# Patient Record
Sex: FEMALE | Race: OTHER | NOT HISPANIC OR LATINO
[De-identification: names, ages, dates, MRNs, and addresses within clinical notes are randomized per-mention and may not be internally consistent; named-entity substitution may affect disease eponyms.]

---

## 2021-02-03 PROBLEM — Z00.00 ENCOUNTER FOR PREVENTIVE HEALTH EXAMINATION: Status: ACTIVE | Noted: 2021-02-03

## 2021-02-18 ENCOUNTER — APPOINTMENT (OUTPATIENT)
Dept: NEUROSURGERY | Facility: CLINIC | Age: 26
End: 2021-02-18

## 2021-05-03 ENCOUNTER — APPOINTMENT (OUTPATIENT)
Dept: NEUROSURGERY | Facility: CLINIC | Age: 26
End: 2021-05-03
Payer: COMMERCIAL

## 2021-05-03 VITALS — WEIGHT: 134 LBS | BODY MASS INDEX: 23.74 KG/M2 | HEIGHT: 63 IN

## 2021-05-03 DIAGNOSIS — Z78.9 OTHER SPECIFIED HEALTH STATUS: ICD-10-CM

## 2021-05-03 PROCEDURE — 99203 OFFICE O/P NEW LOW 30 MIN: CPT | Mod: 95

## 2021-05-04 NOTE — REVIEW OF SYSTEMS
[As Noted in HPI] : as noted in HPI [Negative] : Heme/Lymph [de-identified] : Headaches [FreeTextEntry4] : Pulsatile Tinnitus

## 2021-05-04 NOTE — REASON FOR VISIT
[Home] : at home, [unfilled] , at the time of the visit. [Medical Office: ([facility])___] : at the medical office located in  [Verbal consent obtained from patient] : the patient, [unfilled] [New Patient Visit] : a new patient visit [FreeTextEntry1] : Pulsatile Tinnitus

## 2021-05-04 NOTE — HISTORY OF PRESENT ILLNESS
[de-identified] : Ms. TORRES is a pleasant, right-handed 25 year old female who presents today with a chief complaint of bilateral (L>R) ear pulsatile tinnitus.  It first started about 4 years ago.  Since that time it has been getting progressively worse.  It is described as a intermittent, whooshing sound that is in sync with her pulse.  Pressing on the left side of her neck improves the sound.  Laying flat makes it worse.  She has been seen by ENT and had a normal hearing test as per patient.\par \par How much is pulsatile tinnitus affecting your quality of life (0-10, 10 worst)? 6\par \par She reports occasional right eye blurry vision while she is turning her head.  She has been to neuro-ophth who did not find papilledema.  No diplopia or TVOs.\par \par She has daily headaches worse with standing and improved with laying flat.  She has been seen by neurology and prescribed migraine medication which she occasionally takes but does not feel like it helps.\par \par In 2019, she saw Dr. Hernandes in [place] who did a venogram and as per the patient she did not find significant stenosis.\par \par She has never had a spinal tap.

## 2021-05-04 NOTE — ASSESSMENT
[FreeTextEntry1] : Impression:\par Pulsatile Tinnitus, LEFT > RIGHT \par Headaches, worse with standing and improved with laying flat\par \par Imaging from 2019 without severe stenosis\par \par PLAN\par CT Angiogram Head and Neck with venous phase to assess for venous sinus stenosis and jugular vein stenosis\par Will consider venogram after the CTA\par

## 2021-06-04 ENCOUNTER — APPOINTMENT (OUTPATIENT)
Dept: VASCULAR SURGERY | Facility: CLINIC | Age: 26
End: 2021-06-04
Payer: COMMERCIAL

## 2021-06-04 PROCEDURE — 99072 ADDL SUPL MATRL&STAF TM PHE: CPT

## 2021-06-04 PROCEDURE — 99204 OFFICE O/P NEW MOD 45 MIN: CPT

## 2021-06-08 ENCOUNTER — APPOINTMENT (OUTPATIENT)
Dept: NEUROSURGERY | Facility: CLINIC | Age: 26
End: 2021-06-08

## 2021-06-08 NOTE — HISTORY OF PRESENT ILLNESS
[de-identified] : Ms. TORRES is a pleasant, right-handed 25 year old female who presents today for follow up of bilateral (L>R) ear pulsatile tinnitus.  It first started about 4 years ago.  Since that time it has been getting progressively worse.  It is described as a intermittent, whooshing sound that is in sync with her pulse.  Pressing on the left side of her neck improves the sound.  Laying flat makes it worse.  She has been seen by ENT and had a normal hearing test as per patient.\par \par How much is pulsatile tinnitus affecting your quality of life (0-10, 10 worst)? 6\par \par She reports occasional right eye blurry vision while she is turning her head.  She has been to neuro-ophth who did not find papilledema.  No diplopia or TVOs.\par \par She has daily headaches worse with standing and improved with laying flat.  She has been seen by neurology and prescribed migraine medication which she occasionally takes but does not feel like it helps.\par \par In 2019, she saw Dr. Hernandes in [place] who did a venogram and as per the patient she did not find significant stenosis.\par \par She has never had a spinal tap.

## 2021-06-08 NOTE — REVIEW OF SYSTEMS
[As Noted in HPI] : as noted in HPI [Negative] : Heme/Lymph [de-identified] : Headaches [FreeTextEntry4] : Pulsatile Tinnitus

## 2021-06-08 NOTE — REASON FOR VISIT
[Home] : at home, [unfilled] , at the time of the visit. [Medical Office: ([facility])___] : at the medical office located in  [Verbal consent obtained from patient] : the patient, [unfilled] [Follow-Up: _____] : a [unfilled] follow-up visit

## 2021-06-09 NOTE — REVIEW OF SYSTEMS
[As Noted in HPI] : as noted in HPI [Negative] : Heme/Lymph [Limb Pain] : limb pain [FreeTextEntry9] : R arm pain and swelling

## 2021-06-09 NOTE — HISTORY OF PRESENT ILLNESS
[FreeTextEntry1] : 26 y/o healthy R handed F never smoker, hx of C1 laminectomy in 2019 presents to the office for initial evaluation of VTOS.Patient c/o RUE pain and numbness radiating from the shoulder down to the lower arm which has been present for ~ 5 years.  Pt c/o over the last year she notes RUE pain to progressively worsen particularly with R arm elevation or holding upright posture. Pt states the pain waxes and wanes with associated swelling She adds at times she may experience R clavicle tenderness with shooting pain radiating down to her fingers but denies any RUE decrease strength. She recently had a CTA of the head and neck by Neuro for persistent headaches and tinnitus. She has a hx Chiari malformation which she follows up with closely at [facility]. Furthermore, patient reports she lives between [place] and [place] and during her recent stay in [place] she saw a vascular surgeon for RUE symptoms who advise pt for CT venogram. She denies any RUE swelling at the present time, LUE heaviness, open sores, pain, RUE trauma, no hx of DVTs/PEs. \par \par

## 2021-06-09 NOTE — DATA REVIEWED
[FreeTextEntry1] : RUE duplex completed outside institution on 4/15/2021: lumen compliance at rest. Normal phasic spectral doppler signals at rest in the subclavian vein. Following arm elevation and head rotation ipsilateral the subclavian vein did not demonstrate lumen compliance w/complete loss of venous spectral flow, subclavian vein appears dilated.

## 2021-06-09 NOTE — ADDENDUM
[FreeTextEntry1] : This note was written by Shawna Espinosa on 06/04/2021 acting as scribe for Sobeida Westfall M.D.\par \par \par I, Dr.Vicken Arellano, personally performed the evaluation and management (E/M) services for this new patient.  That E/M includes conducting the initial examination, assessing all conditions, and establishing the plan of care.  Today, my ACP, ARUN Graff, was here to observe my evaluation and management services for this patient to be followed going forward.\par \par \par \par \par

## 2021-06-09 NOTE — PHYSICAL EXAM
[Respiratory Effort] : normal respiratory effort [Normal Rate and Rhythm] : normal rate and rhythm [2+] : left 2+ [No Rash or Lesion] : No rash or lesion [Calm] : calm [Ankle Swelling (On Exam)] : not present [Varicose Veins Of Lower Extremities] : not present [] : not present [de-identified] : Calm and cooperative  [de-identified] : NC/AT  [de-identified] : Supple  [FreeTextEntry1] : RUE: mild swelling from the shoulder down to the proximal forearm, no hypothenar atrophy, no open sores. Small resolving ecchymosis over the distal forearm.  [de-identified] : + FROM UE b/l [de-identified] : grossly intact

## 2021-06-09 NOTE — ASSESSMENT
[Thoracic Outlet Repair] : thoracic outlet repair [FreeTextEntry1] : 24 y/o F with RUE NTOS. On exam, RUE: mild swelling from the shoulder down to the proximal forearm, no hypothenar atrophy, no open sores. Small resolving ecchymosis over the distal forearm from CTA access site. \par \par Plan: \par Discussed findings and treatment options. Reviewed outside medical studies. Low clinical suspicion for venous thoracic outlet syndrome. Patient likely with neurogenic thoracic outlet syndrome pending neurology evaluation and nerve conduction studies. Mild symptoms non attributable to any vascular surgery interventions at this time. Will continue to monitor for now. We will await for NCS results. Patient to continue PT exercises as tolerated. Patient to communicate with us once NCS results available.  [Arterial/Venous Disease] : arterial/venous disease

## 2021-06-24 ENCOUNTER — APPOINTMENT (OUTPATIENT)
Dept: NEUROSURGERY | Facility: CLINIC | Age: 26
End: 2021-06-24
Payer: COMMERCIAL

## 2021-06-24 PROCEDURE — 99213 OFFICE O/P EST LOW 20 MIN: CPT | Mod: 95

## 2021-06-24 NOTE — REVIEW OF SYSTEMS
[As Noted in HPI] : as noted in HPI [Negative] : Heme/Lymph [de-identified] : Headaches [FreeTextEntry4] : Pulsatile Tinnitus
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
## 2021-06-24 NOTE — REASON FOR VISIT
[Home] : at home, [unfilled] , at the time of the visit. [Medical Office: ([facility])___] : at the medical office located in  [Verbal consent obtained from patient] : the patient, [unfilled] [New Patient Visit] : a new patient visit [FreeTextEntry1] : Pulsatile Tinnitus

## 2021-06-24 NOTE — ASSESSMENT
[FreeTextEntry1] : FORMULATION\par Pulsatile Tinnitus, LEFT > RIGHT \par Likely venous\par CT Angiogram Head and Neck with venous sinus stenosis and post-stenotic venous aneurysm.\par I recommended catheter angiogram. venogram, balloon test occlusion and stent-assisted treatment.\par \par IMPRESSION\par JONH TORRES suffers from pulsatile tinnitus from venous origin. 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 We discussed the natural history of intracranial venous aneurysms and I explained to the patient that these aneurysms DO NOT cause intracranial hemorrhage or stroke. \par \par The pulsatile tinnitus is severe and has a negative impact in social life, work and daily living. We discussed treatment options which include observation, trial of Diamox, endovascular treatment, open surgery.\par \par The patient would like to proceed with endovascular treatment. Treatment includes stent -assisted embolization of the wide-neck venous aneurysm of the proximal sigmoid venous sinus. We discussed with the patient my extensive personal experience with this treatment and the results of a recent clinical trial (Mirta et al, Interventional Neuroradiology 2020). We discussed the procedure that has two components. The first part consists of catheter angiogram and manometry to assess the degree of stenosis and confirm the presence of the venous aneurysm. This part is typically performed with the patient awake. The second part consists of embolization of the venous aneurysm utilizing stent, coils or both and is performed under general anesthesia.\par \par The risks, benefits and alternatives of the procedure were discussed with the patient in detail. In my personal experience, the risks are very rare, but the possibility is not zero. Risks include stroke, brain hemorrhage, any type of disability (facial or extremity weakness, facial or extremity numbness, speech difficulties, blindness) and death. There are also possible complications related to the incisions such as infection, pain, swelling and bleeding.\par \par The patient is aware that the procedure requires dual antiplatelet therapy for 1-month post-stent (typically aspirin 325 mg daily and clopidogrel 75 mg daily) and antiplatelet monotherapy (typically aspirin 325 mg daily) for additional 11 months post-stent.\par \par PLAN\par I recommended catheter angiogram. venogram, balloon test occlusion and stent-assisted treatment pending the results of angiogram/ venogram

## 2021-07-01 ENCOUNTER — NON-APPOINTMENT (OUTPATIENT)
Age: 26
End: 2021-07-01

## 2021-07-19 ENCOUNTER — OUTPATIENT (OUTPATIENT)
Dept: OUTPATIENT SERVICES | Facility: HOSPITAL | Age: 26
LOS: 1 days | End: 2021-07-19
Payer: COMMERCIAL

## 2021-07-19 ENCOUNTER — APPOINTMENT (OUTPATIENT)
Dept: NEUROSURGERY | Facility: HOSPITAL | Age: 26
End: 2021-07-19
Payer: COMMERCIAL

## 2021-07-19 VITALS
HEIGHT: 62 IN | WEIGHT: 134.92 LBS | RESPIRATION RATE: 18 BRPM | TEMPERATURE: 98 F | OXYGEN SATURATION: 100 % | SYSTOLIC BLOOD PRESSURE: 99 MMHG | HEART RATE: 77 BPM | DIASTOLIC BLOOD PRESSURE: 72 MMHG

## 2021-07-19 VITALS
RESPIRATION RATE: 16 BRPM | HEART RATE: 71 BPM | OXYGEN SATURATION: 98 % | SYSTOLIC BLOOD PRESSURE: 97 MMHG | DIASTOLIC BLOOD PRESSURE: 65 MMHG

## 2021-07-19 DIAGNOSIS — H93.A9 PULSATILE TINNITUS, UNSPECIFIED EAR: ICD-10-CM

## 2021-07-19 LAB
ANION GAP SERPL CALC-SCNC: 14 MMOL/L — SIGNIFICANT CHANGE UP (ref 5–17)
BUN SERPL-MCNC: 8 MG/DL — SIGNIFICANT CHANGE UP (ref 7–23)
CALCIUM SERPL-MCNC: 10.2 MG/DL — SIGNIFICANT CHANGE UP (ref 8.4–10.5)
CHLORIDE SERPL-SCNC: 100 MMOL/L — SIGNIFICANT CHANGE UP (ref 96–108)
CO2 SERPL-SCNC: 21 MMOL/L — LOW (ref 22–31)
CREAT SERPL-MCNC: 0.7 MG/DL — SIGNIFICANT CHANGE UP (ref 0.5–1.3)
GLUCOSE SERPL-MCNC: 87 MG/DL — SIGNIFICANT CHANGE UP (ref 70–99)
HCT VFR BLD CALC: 41.8 % — SIGNIFICANT CHANGE UP (ref 34.5–45)
HGB BLD-MCNC: 13.8 G/DL — SIGNIFICANT CHANGE UP (ref 11.5–15.5)
MCHC RBC-ENTMCNC: 27.8 PG — SIGNIFICANT CHANGE UP (ref 27–34)
MCHC RBC-ENTMCNC: 33 GM/DL — SIGNIFICANT CHANGE UP (ref 32–36)
MCV RBC AUTO: 84.1 FL — SIGNIFICANT CHANGE UP (ref 80–100)
NRBC # BLD: 0 /100 WBCS — SIGNIFICANT CHANGE UP (ref 0–0)
PLATELET # BLD AUTO: 209 K/UL — SIGNIFICANT CHANGE UP (ref 150–400)
POTASSIUM SERPL-MCNC: 5.3 MMOL/L — SIGNIFICANT CHANGE UP (ref 3.5–5.3)
POTASSIUM SERPL-SCNC: 5.3 MMOL/L — SIGNIFICANT CHANGE UP (ref 3.5–5.3)
RBC # BLD: 4.97 M/UL — SIGNIFICANT CHANGE UP (ref 3.8–5.2)
RBC # FLD: 13.1 % — SIGNIFICANT CHANGE UP (ref 10.3–14.5)
SODIUM SERPL-SCNC: 135 MMOL/L — SIGNIFICANT CHANGE UP (ref 135–145)
WBC # BLD: 6.42 K/UL — SIGNIFICANT CHANGE UP (ref 3.8–10.5)
WBC # FLD AUTO: 6.42 K/UL — SIGNIFICANT CHANGE UP (ref 3.8–10.5)

## 2021-07-19 PROCEDURE — 75774 ARTERY X-RAY EACH VESSEL: CPT | Mod: 26,59

## 2021-07-19 PROCEDURE — 75860 VEIN X-RAY NECK: CPT | Mod: 26,59

## 2021-07-19 PROCEDURE — 80048 BASIC METABOLIC PNL TOTAL CA: CPT

## 2021-07-19 PROCEDURE — 75774 ARTERY X-RAY EACH VESSEL: CPT | Mod: 59

## 2021-07-19 PROCEDURE — C1769: CPT

## 2021-07-19 PROCEDURE — 36224 PLACE CATH CAROTD ART: CPT

## 2021-07-19 PROCEDURE — 36226 PLACE CATH VERTEBRAL ART: CPT | Mod: 50

## 2021-07-19 PROCEDURE — C1894: CPT

## 2021-07-19 PROCEDURE — 75870 VEIN X-RAY SKULL: CPT | Mod: 26,59

## 2021-07-19 PROCEDURE — C2628: CPT

## 2021-07-19 PROCEDURE — 36012 PLACE CATHETER IN VEIN: CPT | Mod: LT

## 2021-07-19 PROCEDURE — C1760: CPT

## 2021-07-19 PROCEDURE — 75820 VEIN X-RAY ARM/LEG: CPT | Mod: 26

## 2021-07-19 PROCEDURE — 75820 VEIN X-RAY ARM/LEG: CPT

## 2021-07-19 PROCEDURE — 36227 PLACE CATH XTRNL CAROTID: CPT

## 2021-07-19 PROCEDURE — 36228 PLACE CATH INTRACRANIAL ART: CPT | Mod: 59

## 2021-07-19 PROCEDURE — 36226 PLACE CATH VERTEBRAL ART: CPT

## 2021-07-19 PROCEDURE — 85027 COMPLETE CBC AUTOMATED: CPT

## 2021-07-19 PROCEDURE — 36218 PLACE CATHETER IN ARTERY: CPT | Mod: 59

## 2021-07-19 PROCEDURE — 36012 PLACE CATHETER IN VEIN: CPT | Mod: 59

## 2021-07-19 PROCEDURE — C1887: CPT

## 2021-07-19 PROCEDURE — 36224 PLACE CATH CAROTD ART: CPT | Mod: 50

## 2021-07-19 PROCEDURE — 75870 VEIN X-RAY SKULL: CPT | Mod: 59

## 2021-07-19 RX ORDER — SODIUM CHLORIDE 9 MG/ML
1000 INJECTION INTRAMUSCULAR; INTRAVENOUS; SUBCUTANEOUS
Refills: 0 | Status: DISCONTINUED | OUTPATIENT
Start: 2021-07-19 | End: 2021-08-03

## 2021-07-19 RX ORDER — HYDROMORPHONE HYDROCHLORIDE 2 MG/ML
0.5 INJECTION INTRAMUSCULAR; INTRAVENOUS; SUBCUTANEOUS
Refills: 0 | Status: DISCONTINUED | OUTPATIENT
Start: 2021-07-19 | End: 2021-07-19

## 2021-07-19 RX ORDER — ONDANSETRON 8 MG/1
4 TABLET, FILM COATED ORAL ONCE
Refills: 0 | Status: DISCONTINUED | OUTPATIENT
Start: 2021-07-19 | End: 2021-07-19

## 2021-07-19 NOTE — H&P ADULT - HISTORY OF PRESENT ILLNESS
This is a 26yo RHD  female who presents today with a chief complaint of bilateral (L>R) ear pulsatile tinnitus. It first started about 4 years ago. Since that time it has been getting progressively worse. It is described as a intermittent, whooshing sound that is in sync with her pulse. Pressing on the left side of her neck improves the sound. Laying flat makes it worse. She has been seen by ENT and had a normal hearing test as per patient.She reports occasional right eye blurry vision while she is turning her head. She has been to neuro-ophth who did not find papilledema. No diplopia or TVOs.She has daily headaches worse with standing and improved with laying flat. She has been seen by neurology and prescribed migraine medication which she occasionally takes but does not feel like it helps. In 2019, she saw Dr. Hernandes in [place] who did a venogram and as per the patient she did not find significant stenosis. CT Angiogram Head and Neck with venous sinus stenosis and post-stenotic venous aneurysm. Patient presents today to neuro IR for selective cerebral venogram, BTO.

## 2021-07-19 NOTE — ASU DISCHARGE PLAN (ADULT/PEDIATRIC) - NURSING INSTRUCTIONS
Please feel free to contact us at [phone] if any problems arise. After 6PM, Monday through Friday, on weekends and on holidays, please call [phone] and ask for the radiology resident on call to be paged.

## 2021-07-19 NOTE — H&P ADULT - ASSESSMENT
This is a 26yo female who suffers from pulsatile tinnitus from venous origin. 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 Patient presents now to neuro IR for cerebral venogram, and BTO .

## 2021-07-19 NOTE — CHART NOTE - NSCHARTNOTEFT_GEN_A_CORE
Interventional Neuro- Radiology   Procedure Note PA-BERTRAM    Procedure: Selective Cerebral Angiography   Pre- Procedure Diagnosis:  Post- Procedure Diagnosis:    : Dr Paresh Mitchell  Physician Assistant: Sylvia Bobo PA-C    Nurse:  Radiologic Tech:  Anesthesiologist:  Sheath:      I/Os: EBL less than 10cc  IV fluids:     cc     Urine output     cc    Contrast Omnipaque 240      cc             Vitals: BP         HR      Spo2     %          Preliminary Report:  Using a 5 Faroese long sheath to the right groin under MAC sedation via left vertebral artery,  left internal carotid artery, left external carotid artery, right vertebral artery, right internal carotid artery, right external carotid artery a selective cerebral angiography was performed and demonstrated                       Official note to follow.  Patient tolerated procedure well, hemodynamically stable, no change in neurological status compared to baseline.  Results discussed with neuro ICU team, patient and patient's family. Right groin sheath was removed, manual compression held to hemostasis for 20 minutes, no active bleeding, no hematoma, quick clot and safeguard balloon dressing applied at Interventional Neuro- Radiology   Procedure Note PA-C    Procedure: Catheter Cerebral Venogram, Angiogram, manometry   Pre- Procedure Diagnosis: bilateral pulsatile tinnitus   Post- Procedure Diagnosis:    : Dr Joshua Kirby  Fellow:     Dr Hernadnez Wilkes   Physician Assistant: Sylvia Bobo PA-C    Nurse:                   Jaydon Parekh RN  Radiologic Tech:   Keven Olmos LRT  Anesthesiologist:   Dr Jose Juan Esparza   Sheath:                  5/4 radial sheath  Sheath:                   5 [place]an short sheath       I/Os: EBL less than 10cc  IV fluids: 200cc Urine due to void  Contrast Omnipaque 240              Vitals: BP 98/65         HR 59      Spo2 100%          Preliminary Report:  Using a 5 [place]an short sheath to the right groin under MAC sedation via left vertebral artery,  left internal carotid artery, left external carotid artery, right vertebral artery, right internal carotid artery, right external carotid artery a selective cerebral angiography was performed and demonstrated                       Official note to follow.  Patient tolerated procedure well, hemodynamically stable, no change in neurological status compared to baseline.  Results discussed with neuro ICU team, patient and patient's family. Right groin sheath was removed, manual compression held to hemostasis for 20 minutes, no active bleeding, no hematoma, quick clot and safeguard balloon dressing applied at Interventional Neuro- Radiology   Procedure Note PA-C    Procedure: Catheter Cerebral Venogram, Angiogram, manometry   Pre- Procedure Diagnosis: bilateral pulsatile tinnitus   Post- Procedure Diagnosis:    : Dr Joshua Kirby  Fellow:     Dr Hernandez Wilkes   Physician Assistant: Sylvia Bobo PA-C    Nurse:                   Jaydon Parekh RN  Radiologic Tech:   Keven Olmos LRT  Anesthesiologist:   Dr Jose Juan Esparza   Sheath:                  5/4 radial sheath  Sheath:                   5 Spanish short sheath       I/Os: EBL less than 10cc  IV fluids: 100cc Urine due to void  Contrast Omnipaque 240 137cc             Vitals: BP 98/65         HR 59      Spo2 100%          Preliminary Report:  Using a 5 Spanish short sheath to bilateral groins under MAC sedation a catheter cerebral venogram,  angiogram was performed and demonstrated                       Official note to follow.  Patient tolerated procedure well, hemodynamically stable, no change in neurological status compared to baseline. Results discussed with patient and patient's family. Right groin sheath was removed, manual compression held to hemostasis for 20 minutes, no active bleeding, no hematoma, quick clot and safeguard balloon dressing applied at

## 2021-07-19 NOTE — H&P ADULT - NSRESEARCHGRANTASSESS_GEN_A_CORE
Not applicable: This is a surgical and/or non-medical patient
(0) understands/communicates without difficulty

## 2021-07-19 NOTE — CHART NOTE - NSCHARTNOTEFT_GEN_A_CORE
Interventional Neuro Radiology  Pre-Procedure Note PA-C    This is a 25 year old right hand dominant female who presents today with a chief complaint of bilateral (L>R) ear pulsatile tinnitus. It first started about 4 years ago. Since that time it has been getting progressively worse. It is described as a intermittent, whooshing sound that is in sync with her pulse. Pressing on the left side of her neck improves the sound. Laying flat makes it worse. She has been seen by ENT and had a normal hearing test as per patient. She reports occasional right eye blurry vision while she is turning her head. She has been to neuro-ophth who did not find papilledema. No diplopia or TVOs.She has daily headaches worse with standing and improved with laying flat. She has been seen by neurology and prescribed migraine medication which she occasionally takes but does not feel like it helps. In 2019, she saw Dr. Hernandes in [place] who did a venogram and as per the patient she did not find significant stenosis. CT Angiogram Head and Neck with venous sinus stenosis and post-stenotic venous aneurysm. Patient presents today to neuro IR for catheter cerebral venogram, angiogram and manometry,             Allergies: No Known Allergies  PMHX: Pulsatile tinnitus  PSHX: no  significant past surgical history  Social History:   FAMILY HISTORY: no pertinent family history in first degree relatives  Current Medications:     Labs:                   Blood Bank:       Assessment/Plan:   This is a 25 year old right hand dominant female  presents with   Patient presents to neuro-IR for selective cerebral angiography.   Procedure, goals, risks, benefits and alternatives  were discussed with patient and patient's family.  All questions were answered.  Risks include but are not limited to stroke, vessel injury, hemorrhage, and or right  groin hematoma. Patient demonstrates understanding  of all risks involved with this procedure and wishes to continue.   Appropriate  content was obtained from patient and consent is in the patient's chart. Interventional Neuro Radiology  Pre-Procedure Note PA-C    This is a 25 year old right hand dominant female who presents today with a chief complaint of bilateral (L>R) ear pulsatile tinnitus. It first started about 4 years ago. Since that time it has been getting progressively worse. It is described as a intermittent, whooshing sound that is in sync with her pulse. Pressing on the left side of her neck improves the sound. Laying flat makes it worse. She has been seen by ENT and had a normal hearing test as per patient. She reports occasional right eye blurry vision while she is turning her head. She has been to neuro-ophth who did not find papilledema. No diplopia or TVOs. She has daily headaches worse with standing and improved with laying flat. She has been seen by neurology and prescribed migraine medication which she occasionally takes but does not feel like it helps. In 2019, she saw Dr. Hernandes in [place] who did a venogram and as per the patient she did not find significant stenosis. CT Angiogram Head and Neck with venous sinus stenosis and post-stenotic venous aneurysm. Patient presents today to neuro IR for catheter cerebral venogram, angiogram and manometry,       Allergies: No Known Allergies  PMHX: Pulsatile tinnitus  PSHX: no  significant past surgical history  Social History: non-tobacco smoker   FAMILY HISTORY: no pertinent family history in first degree relatives  Current Medications:     CBC:          13.8  6.42  41.8  209     135  100  8    5.3   21  0.70  87      Assessment/Plan:   This is a 25 year old right hand dominant female who presents to Neuro IR for a catheter cerebral venogram, angiogram and manometry. Procedure, goals, risks, benefits and alternatives were discussed with patient and patient's mother. All questions were answered. Risks include but are not limited to stroke, vessel injury, hemorrhage, and or right groin hematoma. Patient demonstrates understanding of all risks involved with this procedure and wishes to continue.  Appropriate consent was obtained from patient and consent is in the patient's chart.

## 2021-07-20 ENCOUNTER — TRANSCRIPTION ENCOUNTER (OUTPATIENT)
Age: 26
End: 2021-07-20

## 2021-07-22 PROBLEM — H93.A9 PULSATILE TINNITUS, UNSPECIFIED EAR: Chronic | Status: ACTIVE | Noted: 2021-07-19

## 2021-07-29 ENCOUNTER — APPOINTMENT (OUTPATIENT)
Dept: NEUROSURGERY | Facility: HOSPITAL | Age: 26
End: 2021-07-29
Payer: COMMERCIAL

## 2021-07-29 VITALS — WEIGHT: 134 LBS | BODY MASS INDEX: 23.74 KG/M2 | HEIGHT: 63 IN

## 2021-07-29 PROCEDURE — 99442: CPT | Mod: GT

## 2021-07-29 RX ORDER — ASPIRIN 325 MG/1
325 TABLET, FILM COATED ORAL DAILY
Qty: 30 | Refills: 6 | Status: DISCONTINUED | COMMUNITY
Start: 2021-07-01 | End: 2021-07-29

## 2021-07-29 RX ORDER — IBUPROFEN 200 MG/1
TABLET, COATED ORAL
Refills: 0 | Status: ACTIVE | COMMUNITY

## 2021-07-29 RX ORDER — CLOPIDOGREL BISULFATE 75 MG/1
75 TABLET, FILM COATED ORAL DAILY
Qty: 40 | Refills: 0 | Status: DISCONTINUED | COMMUNITY
Start: 2021-07-01 | End: 2021-07-29

## 2021-08-03 ENCOUNTER — APPOINTMENT (OUTPATIENT)
Dept: NEUROSURGERY | Facility: CLINIC | Age: 26
End: 2021-08-03

## 2021-08-03 DIAGNOSIS — H93.A3 PULSATILE TINNITUS, BILATERAL: ICD-10-CM

## 2021-08-03 DIAGNOSIS — I87.1 COMPRESSION OF VEIN: ICD-10-CM

## 2021-08-12 NOTE — ASSESSMENT
[FreeTextEntry1] : IMPRESSION:\par 1. Pulsatile tinnitus\par \par \par \par PLAN:\par 1. Pt will think about options and plan a follow up visit to discuss surgery.\par
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
## 2021-08-13 ENCOUNTER — APPOINTMENT (OUTPATIENT)
Dept: NEUROSURGERY | Facility: CLINIC | Age: 26
End: 2021-08-13
Payer: COMMERCIAL

## 2021-08-13 PROCEDURE — 99442: CPT | Mod: GT

## 2021-08-26 NOTE — HISTORY OF PRESENT ILLNESS
[Home] : at home, [unfilled] , at the time of the visit. [Medical Office: ([facility])___] : at the medical office located in  [Verbal consent obtained from patient] : the patient, [unfilled]

## 2021-08-30 ENCOUNTER — APPOINTMENT (OUTPATIENT)
Dept: NEUROSURGERY | Facility: CLINIC | Age: 26
End: 2021-08-30

## 2021-10-08 ENCOUNTER — APPOINTMENT (OUTPATIENT)
Dept: NEUROSURGERY | Facility: CLINIC | Age: 26
End: 2021-10-08
Payer: COMMERCIAL

## 2021-10-08 VITALS
BODY MASS INDEX: 23.74 KG/M2 | HEIGHT: 63 IN | SYSTOLIC BLOOD PRESSURE: 107 MMHG | OXYGEN SATURATION: 100 % | WEIGHT: 134 LBS | DIASTOLIC BLOOD PRESSURE: 71 MMHG | HEART RATE: 62 BPM

## 2021-10-08 PROCEDURE — 99215 OFFICE O/P EST HI 40 MIN: CPT

## 2021-10-13 NOTE — ASSESSMENT
[FreeTextEntry1] : IMPRESSION:\par Discussed with the patient and reviewed clinical symptoms and imaging findings. 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 Pulsatile tinnitus more prominent on the left side. significantly interferes with her quality of life as evidence by her multiple consultations with different specialists. There is about 60-70% chance of some improvement of her symptoms by decompressive surgery, removal of styloid process and the C1 transverse process. She is eager to proceed. Risk/ benefit including risk of C/N deficit, stroke, and non improvement of symptoms discussed. We will plan accordingly. \par \par \par PLAN:\par 1. LIJ decompression and removal of styloid process and the C1 transverse process on 10/26/21. Symptoms do not necessitate fusion.  Would avoid fusion due to patients young age.\par 2. PST\par 3. Covid 19 testing\par

## 2021-10-13 NOTE — PHYSICAL EXAM
[General Appearance - Alert] : alert [General Appearance - In No Acute Distress] : in no acute distress [General Appearance - Well Nourished] : well nourished [General Appearance - Well Developed] : well developed [General Appearance - Well-Appearing] : healthy appearing [Oriented To Time, Place, And Person] : oriented to person, place, and time [Impaired Insight] : insight and judgment were intact [Affect] : the affect was normal [Memory Recent] : recent memory was not impaired [Person] : oriented to person [Place] : oriented to place [Time] : oriented to time [Cranial Nerves Optic (II)] : visual acuity intact bilaterally,  pupils equal round and reactive to light [Cranial Nerves Oculomotor (III)] : extraocular motion intact [Cranial Nerves Trigeminal (V)] : facial sensation intact symmetrically [Cranial Nerves Facial (VII)] : face symmetrical [Cranial Nerves Vestibulocochlear (VIII)] : hearing was intact bilaterally [Cranial Nerves Glossopharyngeal (IX)] : tongue and palate midline [Cranial Nerves Accessory (XI - Cranial And Spinal)] : head turning and shoulder shrug symmetric [Cranial Nerves Hypoglossal (XII)] : there was no tongue deviation with protrusion [Motor Strength] : muscle strength was normal in all four extremities [Sclera] : the sclera and conjunctiva were normal [PERRL With Normal Accommodation] : pupils were equal in size, round, reactive to light, with normal accommodation [Extraocular Movements] : extraocular movements were intact [Outer Ear] : the ears and nose were normal in appearance [Hearing Threshold Finger Rub Not Hernando] : hearing was normal [Oropharynx] : the oropharynx was normal [Neck Appearance] : the appearance of the neck was normal [Respiration, Rhythm And Depth] : normal respiratory rhythm and effort [Exaggerated Use Of Accessory Muscles For Inspiration] : no accessory muscle use [Heart Rate And Rhythm] : heart rate was normal and rhythm regular [Abnormal Walk] : normal gait [Involuntary Movements] : no involuntary movements were seen [Motor Tone] : muscle strength and tone were normal [Skin Color & Pigmentation] : normal skin color and pigmentation [] : no rash

## 2021-10-13 NOTE — HISTORY OF PRESENT ILLNESS
[FreeTextEntry1] : Discussed with the patient and reviewed clinical symptoms and imaging findings. 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 Pulsatile tinnitus more prominent on the left side. significantly interferes with her quality of life as evidence by her multiple consultations with different specialists. There is about 60-70% chance of some improvement of her symptoms by decompressive surgery, removal of styloid process and the C1 transverse process. She is eager to proceed. Risk/ benefit including risk of C/N deficit, stroke, and non improvement of symptoms discussed. We will plan accordingly. \par \par Pt is scheduled for neurosurgical intervention on 10/26/21.\par \par States she saw 2 different neurosurgeons who diagnosed her with superior odontoid migration, cranio cervical instability.\par \par [place] axial instability.  Would doing this surgery make her more unstable?

## 2021-10-26 ENCOUNTER — APPOINTMENT (OUTPATIENT)
Dept: NEUROSURGERY | Facility: HOSPITAL | Age: 26
End: 2021-10-26

## 2021-11-18 ENCOUNTER — NON-APPOINTMENT (OUTPATIENT)
Age: 26
End: 2021-11-18

## 2021-12-09 ENCOUNTER — APPOINTMENT (OUTPATIENT)
Dept: NEUROSURGERY | Facility: HOSPITAL | Age: 26
End: 2021-12-09

## 2022-05-26 NOTE — HISTORY OF PRESENT ILLNESS
[de-identified] : 25 y/o female with PMHx of left internal jugular vein stenosis s/p ?LIJ decompression and removal of styloid process and the C1 transverse process

## 2022-06-01 ENCOUNTER — APPOINTMENT (OUTPATIENT)
Dept: NEUROSURGERY | Facility: CLINIC | Age: 27
End: 2022-06-01

## 2022-06-16 ENCOUNTER — APPOINTMENT (OUTPATIENT)
Dept: NEUROSURGERY | Facility: CLINIC | Age: 27
End: 2022-06-16

## 2022-06-16 VITALS — HEIGHT: 63 IN | BODY MASS INDEX: 23.92 KG/M2 | WEIGHT: 135 LBS

## 2022-06-16 DIAGNOSIS — Z82.49 FAMILY HISTORY OF ISCHEMIC HEART DISEASE AND OTHER DISEASES OF THE CIRCULATORY SYSTEM: ICD-10-CM

## 2022-06-16 DIAGNOSIS — G93.5 COMPRESSION OF BRAIN: ICD-10-CM

## 2022-06-16 DIAGNOSIS — Z86.59 PERSONAL HISTORY OF OTHER MENTAL AND BEHAVIORAL DISORDERS: ICD-10-CM

## 2022-06-16 DIAGNOSIS — H93.A9 PULSATILE TINNITUS, UNSPECIFIED EAR: ICD-10-CM

## 2022-06-16 DIAGNOSIS — M53.2X9 SPINAL INSTABILITIES, SITE UNSPECIFIED: ICD-10-CM

## 2022-06-16 DIAGNOSIS — M54.50 LOW BACK PAIN, UNSPECIFIED: ICD-10-CM

## 2022-06-16 PROCEDURE — 99214 OFFICE O/P EST MOD 30 MIN: CPT

## 2022-06-17 ENCOUNTER — NON-APPOINTMENT (OUTPATIENT)
Age: 27
End: 2022-06-17

## 2022-06-29 NOTE — HISTORY OF PRESENT ILLNESS
[de-identified] : Ms. TORRES is status pos Chiari decompression and C1 laminectomy in 2019 by Dr. Moreno ([place]). She also had a C1/2 fusion on 5/11/2022 at [facility]. \par \par She continues to experience multitude of symptoms despite surgical intervention.  She has continued paresthesias in the upper and lower extremities, balance issues, headaches, memory/concentration issues, cognitive/physical fatigue, syncopal episodes, back pain, anxiety, and chronic leg weakness.  She reports the symptoms were apparent prior to surgery and have not improved.\par \par She presents to be evaluated for scoliosis and is questioning if she had a tethered cord.  It should be noted that she did undergo testing for possible pseudotumor at [facility] in the past.  At that time she was advised she does not have pseudotumor cerebri. \par \par She is not doing physical therapy.  She states that she is scheduled to follow-up with her surgeon at [facility] to see when she will be cleared to start PT. \par \par I have reviewed an MRI lumbar spine from 6/1/22 ([facility]). Normal study. No tethered cord. No neurocompression. \par

## 2022-06-29 NOTE — END OF VISIT
[FreeTextEntry3] : ATTENDING ADDENDUM\par Patient seen and examined. Clinical history, imaging, and electronic medical record reviewed independently by me. Assessment and plan per ACP note above. Mrs Angela is a pleasant and engaging young woman with a complex neurosurgical history; in brief, she underwent a Chiari decompression at [place]  several years back for unclear indications and essentially did not do any better after this.  After a long period of back-and-forth with her symptoms and seeing numerous physicians, she underwent reexploration of her decompression and vascular decompression by Dr Quinn after an angiogram demonstrated she might have some compression on the vertebral artery.  This resulted in C1-2 instability and she subsequently underwent a C1-2 fusion very recently by an orthopedic surgeon at an outside hospital.  She now presents with no improvement of any of her symptoms and is trying to obtain another opinion.  She has seen many neurosurgeons and neurologists and other doctors even in [place].  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  Her main complaint is fatigue, mental fogginess.  Cognitive disturbances, dizziness, generalized body pain reminiscent of fibromyalgia.  She also has depressed mood secondary to all surgery she is gone with minimal benefit.  She was also concerned about the possiblity of a tethered cord, of which her lumbar MRI does not demonstrate that.  It is essentially unremarkable.\par \par She is very complex and many of her issues may be psychosocial/psychosomatic and I do not think more surgery is going to benefit her at this time.  I will however obtain a new MRI cervical spine to see the results of her decompression, and evaluate the recent C1-2 fusion that was done.  I will suggest neuropsychological evaluation and she will see me after these scans are completed.  She was also concerned about the possibility of a lumbar tethered cord\par \par Neurologic Exam/Physical Exam\par Alert & Oriented x 3\par CN2-12 grossly intact; EOMI, PERRL, VFF\par Sensation intact to light touch, pain/temperature intact\par Vibration/proprioception Intact\par Motor strength 5/5 in all extremities\par 2+ patellar, 2+ brachioradialis \par  [Time Spent: ___ minutes] : I have spent [unfilled] minutes of time on the encounter.

## 2022-06-29 NOTE — ASSESSMENT
[FreeTextEntry1] : Ms. TORRES will proceed with an updated MRI cervical spine and xrays of the cervical spine with flexion-extension views.  She will also have an x-ray of the thoracolumbar spine to assess for scoliosis.  I will see her back once testing is completed.  She is agreeable.

## 2022-10-10 ENCOUNTER — NON-APPOINTMENT (OUTPATIENT)
Age: 27
End: 2022-10-10

## 2023-03-24 ENCOUNTER — APPOINTMENT (OUTPATIENT)
Dept: OBGYN | Facility: CLINIC | Age: 28
End: 2023-03-24

## 2023-08-31 ENCOUNTER — APPOINTMENT (OUTPATIENT)
Dept: NEUROSURGERY | Facility: CLINIC | Age: 28
End: 2023-08-31
Payer: COMMERCIAL

## 2023-08-31 VITALS
OXYGEN SATURATION: 96 % | HEIGHT: 63 IN | BODY MASS INDEX: 23.21 KG/M2 | WEIGHT: 131 LBS | HEART RATE: 77 BPM | DIASTOLIC BLOOD PRESSURE: 74 MMHG | SYSTOLIC BLOOD PRESSURE: 109 MMHG

## 2023-08-31 PROCEDURE — 99215 OFFICE O/P EST HI 40 MIN: CPT

## 2023-08-31 RX ORDER — COVID-19 ANTIGEN TEST
KIT MISCELLANEOUS
Qty: 4 | Refills: 0 | Status: DISCONTINUED | COMMUNITY
Start: 2022-02-09 | End: 2023-08-31

## 2023-08-31 RX ORDER — NORELGESTROMIN AND ETHINYL ESTRADIOL 150; 35 UG/D; UG/D
150-35 PATCH TRANSDERMAL
Refills: 0 | Status: ACTIVE | COMMUNITY

## 2023-08-31 RX ORDER — CYCLOBENZAPRINE HYDROCHLORIDE 10 MG/1
10 TABLET, FILM COATED ORAL
Qty: 30 | Refills: 0 | Status: DISCONTINUED | COMMUNITY
Start: 2022-05-20 | End: 2023-08-31

## 2023-08-31 RX ORDER — CYCLOBENZAPRINE HYDROCHLORIDE 5 MG/1
5 TABLET, FILM COATED ORAL
Qty: 21 | Refills: 0 | Status: DISCONTINUED | COMMUNITY
Start: 2022-05-14 | End: 2023-08-31

## 2023-08-31 RX ORDER — OXYCODONE AND ACETAMINOPHEN 5; 325 MG/1; MG/1
5-325 TABLET ORAL
Qty: 40 | Refills: 0 | Status: DISCONTINUED | COMMUNITY
Start: 2022-05-09 | End: 2023-08-31

## 2023-08-31 NOTE — PHYSICAL EXAM
[General Appearance - Alert] : alert [General Appearance - In No Acute Distress] : in no acute distress [Oriented To Time, Place, And Person] : oriented to person, place, and time [Impaired Insight] : insight and judgment were intact [Cranial Nerves Optic (II)] : visual acuity intact bilaterally,  pupils equal round and reactive to light [Cranial Nerves Trigeminal (V)] : facial sensation intact symmetrically [Cranial Nerves Oculomotor (III)] : extraocular motion intact [Cranial Nerves Facial (VII)] : face symmetrical [Cranial Nerves Vestibulocochlear (VIII)] : hearing was intact bilaterally [Cranial Nerves Glossopharyngeal (IX)] : tongue and palate midline [Cranial Nerves Hypoglossal (XII)] : there was no tongue deviation with protrusion [Cranial Nerves Accessory (XI - Cranial And Spinal)] : head turning and shoulder shrug symmetric [] : no respiratory distress [Respiration, Rhythm And Depth] : normal respiratory rhythm and effort [Heart Rate And Rhythm] : heart rate was normal and rhythm regular [Abnormal Walk] : normal gait

## 2023-09-05 NOTE — HISTORY OF PRESENT ILLNESS
[de-identified] : Ms. JONH TORRES is a 28-year woman presenting with a PMHx of   EDS, Chiari Decompression 2019 with Dr. Moreno at [place] , s/p Craniocervical  instability who presents for comprehensive neurosurgical evaluation of Left jugular Venous stenosis. Today she reports that her main symptoms are Headaches with exertion, balance difficulty and syncopal episodes. Jugular venous stenosis on Left  Brain fog and cognitive fatigue, and syncope. She reports that she was last normal 10 years ago and had experienced sport-related injuries. She noticed that her symptoms became more pronounced in her college years. She began to develop visual difficulty. She reports that she has had four venograms over 4 years with different plan of care based on her symptoms. She reports that she did not get better after her Chiari surgery and craniocervical fusion.  After venogram Dr. Kirby said it was bony issue and he did not proceed with the stenting Today, she is concerned about her unresolved symptoms. She is looking for treatment that is not aggressive.

## 2023-09-05 NOTE — ASSESSMENT
Macy Hercules RN  Ortho Surg Sched StuDevorahramez Sivan 29 minutes ago (2:36 PM)     MS  Hello,     Please call and reschedule 30 days from 3/3/2023.     Thanks,   Jessica Hercules RN 29 minutes ago (2:36 PM)     MS  Spoke with patient and advised surgery will have to be postponed for 30 days due to risk of pneumonia. Patient upset and states never mind my symptoms started a month ago. Advised patient per medical documentation in chart symptoms started 3/3/2023 and surgery will have to be postponed. Patient verbalized understanding and aware surgery scheduler will contact to reschedule.         Note             --4/3 or after   [FreeTextEntry1] : Ms. JONH TORRES is presenting Left Jugular Venous Stenosis The recommendation is for the following: Imaging evaluation shows evidence of  Smaller Eft jugular venous stenosis  Plan: Surgery for the jugular Venous stenosis surgery decompression is not recommended. Removal of the styloid process is not recommended.  She may consider repeating the venogram  Please see Dr. Navarro's dictation for details. I, Dr. Gonzalo Navarro evaluated the patient with the nurse practitioner Bipin Bingham and established the plan of care. I personally discuss this patient with the nurse practitioner at the time of the visit. I agree with the assessment and plan as written, unless noted below.

## 2023-09-05 NOTE — REVIEW OF SYSTEMS
[As Noted in HPI] : as noted in HPI [Feeling Poorly] : feeling poorly [Abnormal Sensation] : an abnormal sensation [Arthralgias] : arthralgias [Negative] : Genitourinary [Fainting] : no fainting [de-identified] : Mild intermittent headaches

## 2023-09-12 ENCOUNTER — APPOINTMENT (OUTPATIENT)
Dept: NEUROSURGERY | Facility: CLINIC | Age: 28
End: 2023-09-12
Payer: COMMERCIAL

## 2023-09-12 PROCEDURE — 99213 OFFICE O/P EST LOW 20 MIN: CPT | Mod: 95

## 2023-09-15 ENCOUNTER — APPOINTMENT (OUTPATIENT)
Dept: NEUROSURGERY | Facility: CLINIC | Age: 28
End: 2023-09-15
Payer: COMMERCIAL

## 2023-09-15 DIAGNOSIS — I87.1 COMPRESSION OF VEIN: ICD-10-CM

## 2023-09-15 PROCEDURE — 99202 OFFICE O/P NEW SF 15 MIN: CPT | Mod: 95

## 2023-09-18 PROBLEM — I87.1 JUGULAR VEIN STENOSIS: Status: ACTIVE | Noted: 2021-10-08

## 2023-09-20 ENCOUNTER — TRANSCRIPTION ENCOUNTER (OUTPATIENT)
Age: 28
End: 2023-09-20

## 2023-10-13 ENCOUNTER — APPOINTMENT (OUTPATIENT)
Dept: NEUROSURGERY | Facility: CLINIC | Age: 28
End: 2023-10-13

## 2023-10-25 ENCOUNTER — APPOINTMENT (OUTPATIENT)
Dept: NEUROSURGERY | Facility: HOSPITAL | Age: 28
End: 2023-10-25

## 2023-10-27 ENCOUNTER — APPOINTMENT (OUTPATIENT)
Dept: NEUROSURGERY | Facility: CLINIC | Age: 28
End: 2023-10-27
Payer: SELF-PAY

## 2023-10-27 VITALS
TEMPERATURE: 97.6 F | OXYGEN SATURATION: 99 % | HEIGHT: 63 IN | BODY MASS INDEX: 23.21 KG/M2 | SYSTOLIC BLOOD PRESSURE: 99 MMHG | RESPIRATION RATE: 18 BRPM | HEART RATE: 75 BPM | WEIGHT: 131 LBS | DIASTOLIC BLOOD PRESSURE: 64 MMHG

## 2023-10-27 DIAGNOSIS — R55 SYNCOPE AND COLLAPSE: ICD-10-CM

## 2023-10-27 PROCEDURE — 99204 OFFICE O/P NEW MOD 45 MIN: CPT

## 2023-11-12 ENCOUNTER — OUTPATIENT (OUTPATIENT)
Dept: OUTPATIENT SERVICES | Facility: HOSPITAL | Age: 28
LOS: 1 days | End: 2023-11-12
Payer: SELF-PAY

## 2023-11-12 ENCOUNTER — APPOINTMENT (OUTPATIENT)
Dept: MRI IMAGING | Facility: HOSPITAL | Age: 28
End: 2023-11-12

## 2023-11-12 PROCEDURE — 70553 MRI BRAIN STEM W/O & W/DYE: CPT

## 2023-11-12 PROCEDURE — A9585: CPT

## 2023-11-12 PROCEDURE — 70553 MRI BRAIN STEM W/O & W/DYE: CPT | Mod: 26

## 2023-12-04 ENCOUNTER — APPOINTMENT (OUTPATIENT)
Dept: NEUROSURGERY | Facility: CLINIC | Age: 28
End: 2023-12-04
Payer: MEDICAID

## 2023-12-04 DIAGNOSIS — M54.2 CERVICALGIA: ICD-10-CM

## 2023-12-04 DIAGNOSIS — R26.89 OTHER ABNORMALITIES OF GAIT AND MOBILITY: ICD-10-CM

## 2023-12-04 PROCEDURE — 99214 OFFICE O/P EST MOD 30 MIN: CPT | Mod: 95

## 2023-12-05 PROBLEM — M54.2 NECK PAIN: Status: ACTIVE | Noted: 2022-06-16

## 2023-12-05 PROBLEM — R26.89 BALANCE PROBLEM: Status: ACTIVE | Noted: 2023-08-31

## 2023-12-11 ENCOUNTER — APPOINTMENT (OUTPATIENT)
Dept: NEUROSURGERY | Facility: CLINIC | Age: 28
End: 2023-12-11

## 2024-07-08 ENCOUNTER — APPOINTMENT (OUTPATIENT)
Dept: NEUROSURGERY | Facility: CLINIC | Age: 29
End: 2024-07-08
Payer: MEDICAID

## 2024-07-08 DIAGNOSIS — I87.1 COMPRESSION OF VEIN: ICD-10-CM

## 2024-07-08 PROCEDURE — 99212 OFFICE O/P EST SF 10 MIN: CPT

## 2024-07-09 ENCOUNTER — APPOINTMENT (OUTPATIENT)
Dept: NEUROSURGERY | Facility: CLINIC | Age: 29
End: 2024-07-09

## 2024-07-11 ENCOUNTER — APPOINTMENT (OUTPATIENT)
Dept: MRI IMAGING | Facility: HOSPITAL | Age: 29
End: 2024-07-11

## 2024-07-26 ENCOUNTER — APPOINTMENT (OUTPATIENT)
Dept: MRI IMAGING | Facility: HOSPITAL | Age: 29
End: 2024-07-26

## 2024-07-26 PROCEDURE — 70544 MR ANGIOGRAPHY HEAD W/O DYE: CPT | Mod: 26

## 2024-07-26 PROCEDURE — 70547 MR ANGIOGRAPHY NECK W/O DYE: CPT | Mod: 26
